# Patient Record
Sex: MALE | Race: WHITE | NOT HISPANIC OR LATINO
[De-identification: names, ages, dates, MRNs, and addresses within clinical notes are randomized per-mention and may not be internally consistent; named-entity substitution may affect disease eponyms.]

---

## 2017-05-18 ENCOUNTER — NON-APPOINTMENT (OUTPATIENT)
Age: 72
End: 2017-05-18

## 2017-05-18 ENCOUNTER — LABORATORY RESULT (OUTPATIENT)
Age: 72
End: 2017-05-18

## 2017-05-18 ENCOUNTER — APPOINTMENT (OUTPATIENT)
Dept: CARDIOLOGY | Facility: CLINIC | Age: 72
End: 2017-05-18

## 2017-05-18 VITALS
OXYGEN SATURATION: 95 % | HEART RATE: 56 BPM | HEIGHT: 69 IN | DIASTOLIC BLOOD PRESSURE: 70 MMHG | WEIGHT: 165 LBS | SYSTOLIC BLOOD PRESSURE: 120 MMHG | BODY MASS INDEX: 24.44 KG/M2

## 2017-05-21 LAB
ALBUMIN SERPL ELPH-MCNC: 4.3 G/DL
ALP BLD-CCNC: 98 U/L
ALT SERPL-CCNC: 20 U/L
ANION GAP SERPL CALC-SCNC: 16 MMOL/L
AST SERPL-CCNC: 16 U/L
BASOPHILS # BLD AUTO: 0.02 K/UL
BASOPHILS NFR BLD AUTO: 0.3 %
BILIRUB SERPL-MCNC: 0.3 MG/DL
BUN SERPL-MCNC: 16 MG/DL
CALCIUM SERPL-MCNC: 9.8 MG/DL
CHLORIDE SERPL-SCNC: 102 MMOL/L
CHOLEST SERPL-MCNC: 163 MG/DL
CHOLEST/HDLC SERPL: 4 RATIO
CO2 SERPL-SCNC: 24 MMOL/L
CREAT SERPL-MCNC: 1.19 MG/DL
EOSINOPHIL # BLD AUTO: 0.18 K/UL
EOSINOPHIL NFR BLD AUTO: 3.1 %
FT4I SERPL CALC-MCNC: 7.8 INDEX
GLUCOSE SERPL-MCNC: 97 MG/DL
HBA1C MFR BLD HPLC: 6 %
HCT VFR BLD CALC: 44.2 %
HDLC SERPL-MCNC: 41 MG/DL
HGB BLD-MCNC: 14.1 G/DL
IMM GRANULOCYTES NFR BLD AUTO: 0.2 %
LDLC SERPL CALC-MCNC: 98 MG/DL
LYMPHOCYTES # BLD AUTO: 1.14 K/UL
LYMPHOCYTES NFR BLD AUTO: 19.6 %
MAN DIFF?: NORMAL
MCHC RBC-ENTMCNC: 29.3 PG
MCHC RBC-ENTMCNC: 31.9 GM/DL
MCV RBC AUTO: 91.7 FL
MONOCYTES # BLD AUTO: 0.47 K/UL
MONOCYTES NFR BLD AUTO: 8.1 %
NEUTROPHILS # BLD AUTO: 4 K/UL
NEUTROPHILS NFR BLD AUTO: 68.7 %
PLATELET # BLD AUTO: 211 K/UL
POTASSIUM SERPL-SCNC: 4.8 MMOL/L
PROT SERPL-MCNC: 7 G/DL
PSA FREE FLD-MCNC: 19.5
PSA FREE SERPL-MCNC: 0.38 NG/ML
PSA SERPL-MCNC: 1.95 NG/ML
RBC # BLD: 4.82 M/UL
RBC # FLD: 13.6 %
SODIUM SERPL-SCNC: 142 MMOL/L
T3RU NFR SERPL: 0.94 INDEX
T4 FREE SERPL-MCNC: 1.2 NG/DL
T4 SERPL-MCNC: 7.3 UG/DL
TRIGL SERPL-MCNC: 119 MG/DL
TSH SERPL-ACNC: 2.48 UIU/ML
WBC # FLD AUTO: 5.82 K/UL

## 2017-06-20 ENCOUNTER — MEDICATION RENEWAL (OUTPATIENT)
Age: 72
End: 2017-06-20

## 2017-06-27 ENCOUNTER — APPOINTMENT (OUTPATIENT)
Dept: INTERNAL MEDICINE | Facility: CLINIC | Age: 72
End: 2017-06-27

## 2017-09-21 ENCOUNTER — APPOINTMENT (OUTPATIENT)
Dept: ORTHOPEDIC SURGERY | Facility: CLINIC | Age: 72
End: 2017-09-21
Payer: MEDICARE

## 2017-09-21 VITALS
SYSTOLIC BLOOD PRESSURE: 137 MMHG | DIASTOLIC BLOOD PRESSURE: 74 MMHG | BODY MASS INDEX: 23.7 KG/M2 | HEART RATE: 66 BPM | HEIGHT: 69 IN | WEIGHT: 160 LBS

## 2017-09-21 DIAGNOSIS — Z78.9 OTHER SPECIFIED HEALTH STATUS: ICD-10-CM

## 2017-09-21 DIAGNOSIS — Z87.39 PERSONAL HISTORY OF OTHER DISEASES OF THE MUSCULOSKELETAL SYSTEM AND CONNECTIVE TISSUE: ICD-10-CM

## 2017-09-21 DIAGNOSIS — M46.92 UNSPECIFIED INFLAMMATORY SPONDYLOPATHY, CERVICAL REGION: ICD-10-CM

## 2017-09-21 PROCEDURE — 72100 X-RAY EXAM L-S SPINE 2/3 VWS: CPT

## 2017-09-21 PROCEDURE — 99205 OFFICE O/P NEW HI 60 MIN: CPT

## 2018-04-09 ENCOUNTER — APPOINTMENT (OUTPATIENT)
Dept: ORTHOPEDIC SURGERY | Facility: CLINIC | Age: 73
End: 2018-04-09
Payer: MEDICARE

## 2018-04-09 DIAGNOSIS — M25.572 PAIN IN LEFT ANKLE AND JOINTS OF LEFT FOOT: ICD-10-CM

## 2018-04-09 PROCEDURE — 73590 X-RAY EXAM OF LOWER LEG: CPT | Mod: LT

## 2018-04-09 PROCEDURE — 73610 X-RAY EXAM OF ANKLE: CPT | Mod: LT

## 2018-04-09 PROCEDURE — 99214 OFFICE O/P EST MOD 30 MIN: CPT

## 2018-04-13 ENCOUNTER — MEDICATION RENEWAL (OUTPATIENT)
Age: 73
End: 2018-04-13

## 2018-05-08 ENCOUNTER — APPOINTMENT (OUTPATIENT)
Dept: ALLERGY | Facility: CLINIC | Age: 73
End: 2018-05-08
Payer: MEDICARE

## 2018-05-08 VITALS
BODY MASS INDEX: 24.44 KG/M2 | SYSTOLIC BLOOD PRESSURE: 130 MMHG | WEIGHT: 165 LBS | HEIGHT: 69 IN | DIASTOLIC BLOOD PRESSURE: 80 MMHG | RESPIRATION RATE: 16 BRPM | HEART RATE: 72 BPM

## 2018-05-08 PROCEDURE — 99204 OFFICE O/P NEW MOD 45 MIN: CPT | Mod: 25

## 2018-05-08 PROCEDURE — 95004 PERQ TESTS W/ALRGNC XTRCS: CPT

## 2018-05-08 PROCEDURE — 95018 ALL TSTG PERQ&IQ DRUGS/BIOL: CPT

## 2018-05-08 RX ORDER — HYDROCORTISONE 25 MG/G
2.5 CREAM TOPICAL
Qty: 2 | Refills: 2 | Status: DISCONTINUED | COMMUNITY
Start: 2017-05-18 | End: 2018-05-08

## 2018-05-15 ENCOUNTER — APPOINTMENT (OUTPATIENT)
Dept: ALLERGY | Facility: CLINIC | Age: 73
End: 2018-05-15
Payer: MEDICARE

## 2018-05-15 PROCEDURE — 95018 ALL TSTG PERQ&IQ DRUGS/BIOL: CPT

## 2018-05-15 PROCEDURE — 95024 IQ TESTS W/ALLERGENIC XTRCS: CPT

## 2018-05-18 ENCOUNTER — LABORATORY RESULT (OUTPATIENT)
Age: 73
End: 2018-05-18

## 2018-05-23 ENCOUNTER — APPOINTMENT (OUTPATIENT)
Dept: ALLERGY | Facility: CLINIC | Age: 73
End: 2018-05-23
Payer: MEDICARE

## 2018-05-23 PROCEDURE — 95117 IMMUNOTHERAPY INJECTIONS: CPT

## 2018-05-24 ENCOUNTER — MOBILE ON CALL (OUTPATIENT)
Age: 73
End: 2018-05-24

## 2018-05-30 ENCOUNTER — APPOINTMENT (OUTPATIENT)
Dept: ALLERGY | Facility: CLINIC | Age: 73
End: 2018-05-30
Payer: MEDICARE

## 2018-05-30 PROCEDURE — 95117 IMMUNOTHERAPY INJECTIONS: CPT

## 2018-06-04 ENCOUNTER — APPOINTMENT (OUTPATIENT)
Dept: ALLERGY | Facility: CLINIC | Age: 73
End: 2018-06-04
Payer: MEDICARE

## 2018-06-04 PROCEDURE — 95117 IMMUNOTHERAPY INJECTIONS: CPT

## 2018-06-06 ENCOUNTER — APPOINTMENT (OUTPATIENT)
Dept: ALLERGY | Facility: CLINIC | Age: 73
End: 2018-06-06
Payer: MEDICARE

## 2018-06-06 DIAGNOSIS — Z86.79 PERSONAL HISTORY OF OTHER DISEASES OF THE CIRCULATORY SYSTEM: ICD-10-CM

## 2018-06-06 DIAGNOSIS — Z82.49 FAMILY HISTORY OF ISCHEMIC HEART DISEASE AND OTHER DISEASES OF THE CIRCULATORY SYSTEM: ICD-10-CM

## 2018-06-06 DIAGNOSIS — Z86.39 PERSONAL HISTORY OF OTHER ENDOCRINE, NUTRITIONAL AND METABOLIC DISEASE: ICD-10-CM

## 2018-06-06 PROCEDURE — 95117 IMMUNOTHERAPY INJECTIONS: CPT

## 2018-06-12 ENCOUNTER — APPOINTMENT (OUTPATIENT)
Dept: ALLERGY | Facility: CLINIC | Age: 73
End: 2018-06-12
Payer: MEDICARE

## 2018-06-12 PROCEDURE — 95117 IMMUNOTHERAPY INJECTIONS: CPT

## 2018-06-14 PROBLEM — Z86.79 HISTORY OF HYPERTENSION: Status: RESOLVED | Noted: 2018-06-14 | Resolved: 2018-06-14

## 2018-06-14 PROBLEM — Z86.39 HISTORY OF HYPERCHOLESTEROLEMIA: Status: RESOLVED | Noted: 2018-06-14 | Resolved: 2018-06-14

## 2018-06-14 PROBLEM — Z82.49 FAMILY HISTORY OF HYPERTENSION: Status: ACTIVE | Noted: 2018-06-14

## 2018-06-15 ENCOUNTER — RECORD ABSTRACTING (OUTPATIENT)
Age: 73
End: 2018-06-15

## 2018-06-18 ENCOUNTER — NON-APPOINTMENT (OUTPATIENT)
Age: 73
End: 2018-06-18

## 2018-06-18 ENCOUNTER — APPOINTMENT (OUTPATIENT)
Dept: ALLERGY | Facility: CLINIC | Age: 73
End: 2018-06-18
Payer: MEDICARE

## 2018-06-18 ENCOUNTER — APPOINTMENT (OUTPATIENT)
Dept: INTERNAL MEDICINE | Facility: CLINIC | Age: 73
End: 2018-06-18
Payer: MEDICARE

## 2018-06-18 VITALS
WEIGHT: 166 LBS | OXYGEN SATURATION: 95 % | TEMPERATURE: 97.4 F | BODY MASS INDEX: 24.59 KG/M2 | DIASTOLIC BLOOD PRESSURE: 73 MMHG | SYSTOLIC BLOOD PRESSURE: 117 MMHG | HEART RATE: 78 BPM | HEIGHT: 69 IN

## 2018-06-18 DIAGNOSIS — K44.9 DIAPHRAGMATIC HERNIA W/OUT OBSTRUCTION OR GANGRENE: ICD-10-CM

## 2018-06-18 DIAGNOSIS — Z00.00 ENCOUNTER FOR GENERAL ADULT MEDICAL EXAMINATION W/OUT ABNORMAL FINDINGS: ICD-10-CM

## 2018-06-18 PROCEDURE — 93000 ELECTROCARDIOGRAM COMPLETE: CPT

## 2018-06-18 PROCEDURE — G0439: CPT

## 2018-06-18 PROCEDURE — 95117 IMMUNOTHERAPY INJECTIONS: CPT

## 2018-06-18 PROCEDURE — 99214 OFFICE O/P EST MOD 30 MIN: CPT | Mod: 25

## 2018-06-18 PROCEDURE — 36415 COLL VENOUS BLD VENIPUNCTURE: CPT

## 2018-06-18 RX ORDER — ASPIRIN 325 MG/1
TABLET, FILM COATED ORAL
Refills: 0 | Status: DISCONTINUED | COMMUNITY
End: 2018-06-18

## 2018-06-18 RX ORDER — LISINOPRIL AND HYDROCHLOROTHIAZIDE TABLETS 10; 12.5 MG/1; MG/1
TABLET ORAL
Refills: 0 | Status: DISCONTINUED | COMMUNITY
End: 2018-06-18

## 2018-06-18 NOTE — PLAN
[FreeTextEntry1] : Annual exam\par had vaccine and recent colon\par \par blood pressure good\par gerd controlled med\par tick exposure  check lyme\par doxycycline 2 tab prn

## 2018-06-18 NOTE — HEALTH RISK ASSESSMENT
[Very Good] : ~his/her~  mood as very good [] : No [No falls in past year] : Patient reported no falls in the past year [0] : 2) Feeling down, depressed, or hopeless: Not at all (0) [FAX9Denad] : 0 [Patient reported colonoscopy was normal] : Patient reported colonoscopy was normal [Change in mental status noted] : No change in mental status noted [Language] : denies difficulty with language [Behavior] : denies difficulty with behavior [Learning/Retaining New Information] : denies difficulty learning/retaining new information [Handling Complex Tasks] : denies difficulty handling complex tasks [Reasoning] : denies difficulty with reasoning [Spatial Ability and Orientation] : denies difficulty with spatial ability and orientation [None] : None [With Family] : lives with family [College] : College [] :  [Sexually Active] : sexually active [Fully functional (bathing, dressing, toileting, transferring, walking, feeding)] : Fully functional (bathing, dressing, toileting, transferring, walking, feeding) [Fully functional (using the telephone, shopping, preparing meals, housekeeping, doing laundry, using] : Fully functional and needs no help or supervision to perform IADLs (using the telephone, shopping, preparing meals, housekeeping, doing laundry, using transportation, managing medications and managing finances) [Reports changes in hearing] : Reports no changes in hearing [Reports changes in vision] : Reports no changes in vision [Reports changes in dental health] : Reports no changes in dental health [Smoke Detector] : smoke detector [Carbon Monoxide Detector] : carbon monoxide detector [Guns at Home] : no guns at home [ColonoscopyDate] : 05/18

## 2018-06-18 NOTE — HISTORY OF PRESENT ILLNESS
[FreeTextEntry1] : Annual exam [de-identified] : Annual exam\par had all vaccine\par colon may 2018\par \par high blood pressure on med\par exposed to tick\par see Dr Jose Manuel Lund for orthopedic problems\par stomach controlled by prilosec\par mild ed\par play golf\par \par

## 2018-06-25 ENCOUNTER — APPOINTMENT (OUTPATIENT)
Dept: ORTHOPEDIC SURGERY | Facility: CLINIC | Age: 73
End: 2018-06-25
Payer: MEDICARE

## 2018-06-25 VITALS
BODY MASS INDEX: 23.7 KG/M2 | HEIGHT: 69 IN | SYSTOLIC BLOOD PRESSURE: 152 MMHG | WEIGHT: 160 LBS | DIASTOLIC BLOOD PRESSURE: 89 MMHG | HEART RATE: 59 BPM

## 2018-06-25 PROCEDURE — 73610 X-RAY EXAM OF ANKLE: CPT | Mod: LT

## 2018-06-25 PROCEDURE — 99214 OFFICE O/P EST MOD 30 MIN: CPT

## 2018-06-27 ENCOUNTER — APPOINTMENT (OUTPATIENT)
Dept: ALLERGY | Facility: CLINIC | Age: 73
End: 2018-06-27
Payer: MEDICARE

## 2018-06-27 PROCEDURE — 95117 IMMUNOTHERAPY INJECTIONS: CPT

## 2018-07-16 ENCOUNTER — APPOINTMENT (OUTPATIENT)
Dept: ALLERGY | Facility: CLINIC | Age: 73
End: 2018-07-16
Payer: MEDICARE

## 2018-07-16 PROCEDURE — 95117 IMMUNOTHERAPY INJECTIONS: CPT

## 2018-07-25 ENCOUNTER — APPOINTMENT (OUTPATIENT)
Dept: ALLERGY | Facility: CLINIC | Age: 73
End: 2018-07-25
Payer: MEDICARE

## 2018-07-25 PROCEDURE — 95117 IMMUNOTHERAPY INJECTIONS: CPT

## 2018-08-01 ENCOUNTER — APPOINTMENT (OUTPATIENT)
Dept: ALLERGY | Facility: CLINIC | Age: 73
End: 2018-08-01
Payer: MEDICARE

## 2018-08-01 PROCEDURE — 95117 IMMUNOTHERAPY INJECTIONS: CPT

## 2018-08-06 ENCOUNTER — APPOINTMENT (OUTPATIENT)
Dept: ALLERGY | Facility: CLINIC | Age: 73
End: 2018-08-06
Payer: MEDICARE

## 2018-08-06 PROCEDURE — 95117 IMMUNOTHERAPY INJECTIONS: CPT

## 2018-08-13 ENCOUNTER — MEDICATION RENEWAL (OUTPATIENT)
Age: 73
End: 2018-08-13

## 2018-08-15 ENCOUNTER — MEDICATION RENEWAL (OUTPATIENT)
Age: 73
End: 2018-08-15

## 2018-08-20 ENCOUNTER — MEDICATION RENEWAL (OUTPATIENT)
Age: 73
End: 2018-08-20

## 2018-10-12 ENCOUNTER — MEDICATION RENEWAL (OUTPATIENT)
Age: 73
End: 2018-10-12

## 2018-10-19 ENCOUNTER — MEDICATION RENEWAL (OUTPATIENT)
Age: 73
End: 2018-10-19

## 2018-10-19 DIAGNOSIS — N41.9 INFLAMMATORY DISEASE OF PROSTATE, UNSPECIFIED: ICD-10-CM

## 2018-11-12 ENCOUNTER — APPOINTMENT (OUTPATIENT)
Dept: INTERNAL MEDICINE | Facility: CLINIC | Age: 73
End: 2018-11-12
Payer: MEDICARE

## 2018-11-12 VITALS
BODY MASS INDEX: 24.44 KG/M2 | SYSTOLIC BLOOD PRESSURE: 116 MMHG | TEMPERATURE: 97.4 F | WEIGHT: 165 LBS | DIASTOLIC BLOOD PRESSURE: 71 MMHG | HEIGHT: 69 IN | HEART RATE: 71 BPM | OXYGEN SATURATION: 96 %

## 2018-11-12 PROCEDURE — 99213 OFFICE O/P EST LOW 20 MIN: CPT

## 2018-11-12 RX ORDER — DOXYCYCLINE HYCLATE 100 MG/1
100 CAPSULE ORAL
Qty: 10 | Refills: 0 | Status: DISCONTINUED | COMMUNITY
Start: 2018-06-18 | End: 2018-11-12

## 2018-11-12 NOTE — PHYSICAL EXAM
[No Acute Distress] : no acute distress [Well Nourished] : well nourished [Normal Sclera/Conjunctiva] : normal sclera/conjunctiva [PERRL] : pupils equal round and reactive to light [No JVD] : no jugular venous distention [Supple] : supple [No Respiratory Distress] : no respiratory distress  [Clear to Auscultation] : lungs were clear to auscultation bilaterally [de-identified] : right turbinate congested with purlulent

## 2018-11-12 NOTE — REVIEW OF SYSTEMS
[Nasal Discharge] : nasal discharge [Cough] : cough [Fever] : no fever [Night Sweats] : no night sweats [Discharge] : no discharge [Vision Problems] : no vision problems [Earache] : no earache [Chest Pain] : no chest pain [Orthopena] : no orthopnea

## 2018-11-12 NOTE — HISTORY OF PRESENT ILLNESS
[FreeTextEntry8] : Cough with green phlegm\par nose and chest\par recent bactrim for prostate\par some nosebleeds\par had low grade temperature\par not sob\par recent nuc cst conn neg\par

## 2019-04-29 ENCOUNTER — APPOINTMENT (OUTPATIENT)
Dept: CARDIOLOGY | Facility: CLINIC | Age: 74
End: 2019-04-29
Payer: MEDICARE

## 2019-04-29 ENCOUNTER — NON-APPOINTMENT (OUTPATIENT)
Age: 74
End: 2019-04-29

## 2019-04-29 VITALS
DIASTOLIC BLOOD PRESSURE: 84 MMHG | HEIGHT: 69 IN | HEART RATE: 74 BPM | BODY MASS INDEX: 23.55 KG/M2 | OXYGEN SATURATION: 94 % | WEIGHT: 159 LBS | SYSTOLIC BLOOD PRESSURE: 166 MMHG

## 2019-04-29 PROCEDURE — 99215 OFFICE O/P EST HI 40 MIN: CPT

## 2019-04-29 PROCEDURE — 93000 ELECTROCARDIOGRAM COMPLETE: CPT

## 2019-07-17 ENCOUNTER — APPOINTMENT (OUTPATIENT)
Dept: INTERNAL MEDICINE | Facility: CLINIC | Age: 74
End: 2019-07-17
Payer: MEDICARE

## 2019-07-17 VITALS
WEIGHT: 164 LBS | TEMPERATURE: 98 F | DIASTOLIC BLOOD PRESSURE: 70 MMHG | BODY MASS INDEX: 24.29 KG/M2 | HEIGHT: 69 IN | HEART RATE: 76 BPM | SYSTOLIC BLOOD PRESSURE: 105 MMHG

## 2019-07-17 PROCEDURE — G0439: CPT

## 2019-07-17 RX ORDER — ROSUVASTATIN CALCIUM 10 MG/1
10 TABLET, FILM COATED ORAL DAILY
Qty: 90 | Refills: 3 | Status: DISCONTINUED | COMMUNITY
Start: 2018-08-13 | End: 2019-07-17

## 2019-07-17 RX ORDER — CEFUROXIME AXETIL 500 MG/1
500 TABLET ORAL
Qty: 20 | Refills: 1 | Status: DISCONTINUED | COMMUNITY
Start: 2018-11-12 | End: 2019-07-17

## 2019-07-17 RX ORDER — EZETIMIBE 10 MG/1
10 TABLET ORAL
Qty: 90 | Refills: 3 | Status: ACTIVE | COMMUNITY
Start: 2019-07-17 | End: 1900-01-01

## 2019-07-17 RX ORDER — SILDENAFIL 20 MG/1
20 TABLET ORAL
Qty: 50 | Refills: 5 | Status: ACTIVE | COMMUNITY
Start: 2018-06-18 | End: 1900-01-01

## 2019-07-17 NOTE — PLAN
[FreeTextEntry1] : Annual exam\par had all vaccines\par colon within 2 years\par \par \par high bp now low\par to lower lisinopril from 40 to 20\par consider lowering other drug\par \par cholesterol to try zetia  intolerant of statins\par \par

## 2019-07-17 NOTE — HISTORY OF PRESENT ILLNESS
[de-identified] : annual exam\par had all vaccine\par colon 2 years ago\par \par blood pressure on med\par intolerant of statin\par vaso vagal\par \par

## 2019-07-17 NOTE — REVIEW OF SYSTEMS
[Fever] : no fever [Night Sweats] : no night sweats [Discharge] : no discharge [Vision Problems] : no vision problems [Earache] : no earache [Chest Pain] : no chest pain [Shortness Of Breath] : no shortness of breath [Wheezing] : no wheezing [Abdominal Pain] : no abdominal pain [Vomiting] : no vomiting [Dysuria] : no dysuria [Hematuria] : no hematuria [Itching] : no itching [Headache] : no headache [Memory Loss] : no memory loss

## 2019-07-22 ENCOUNTER — MEDICATION RENEWAL (OUTPATIENT)
Age: 74
End: 2019-07-22

## 2019-09-20 ENCOUNTER — MEDICATION RENEWAL (OUTPATIENT)
Age: 74
End: 2019-09-20

## 2019-11-07 ENCOUNTER — MEDICATION RENEWAL (OUTPATIENT)
Age: 74
End: 2019-11-07

## 2019-12-12 ENCOUNTER — MEDICATION RENEWAL (OUTPATIENT)
Age: 74
End: 2019-12-12

## 2019-12-26 ENCOUNTER — RX RENEWAL (OUTPATIENT)
Age: 74
End: 2019-12-26

## 2020-09-30 ENCOUNTER — RX RENEWAL (OUTPATIENT)
Age: 75
End: 2020-09-30

## 2020-10-26 ENCOUNTER — NON-APPOINTMENT (OUTPATIENT)
Age: 75
End: 2020-10-26

## 2020-10-26 ENCOUNTER — APPOINTMENT (OUTPATIENT)
Dept: CARDIOLOGY | Facility: CLINIC | Age: 75
End: 2020-10-26
Payer: MEDICARE

## 2020-10-26 VITALS
BODY MASS INDEX: 23.92 KG/M2 | WEIGHT: 162 LBS | HEART RATE: 58 BPM | OXYGEN SATURATION: 96 % | DIASTOLIC BLOOD PRESSURE: 86 MMHG | SYSTOLIC BLOOD PRESSURE: 142 MMHG | TEMPERATURE: 96.7 F

## 2020-10-26 DIAGNOSIS — S82.822D TORUS FRACTURE OF LOWER END OF LEFT FIBULA, SUBSEQUENT ENCOUNTER FOR FRACTURE WITH ROUTINE HEALING: ICD-10-CM

## 2020-10-26 DIAGNOSIS — Z87.09 PERSONAL HISTORY OF OTHER DISEASES OF THE RESPIRATORY SYSTEM: ICD-10-CM

## 2020-10-26 DIAGNOSIS — S82.892A OTHER FRACTURE OF LEFT LOWER LEG, INITIAL ENCOUNTER FOR CLOSED FRACTURE: ICD-10-CM

## 2020-10-26 DIAGNOSIS — Z87.2 PERSONAL HISTORY OF DISEASES OF THE SKIN AND SUBCUTANEOUS TISSUE: ICD-10-CM

## 2020-10-26 DIAGNOSIS — S82.822A TORUS FRACTURE OF LOWER END OF LEFT FIBULA, INITIAL ENCOUNTER FOR CLOSED FRACTURE: ICD-10-CM

## 2020-10-26 PROCEDURE — 36415 COLL VENOUS BLD VENIPUNCTURE: CPT

## 2020-10-26 PROCEDURE — 93000 ELECTROCARDIOGRAM COMPLETE: CPT

## 2020-10-26 PROCEDURE — 99215 OFFICE O/P EST HI 40 MIN: CPT

## 2020-10-26 RX ORDER — HYDROCODONE BITARTRATE AND ACETAMINOPHEN 7.5; 325 MG/1; MG/1
7.5-325 TABLET ORAL
Qty: 20 | Refills: 0 | Status: COMPLETED | COMMUNITY
Start: 2020-07-06

## 2020-10-26 RX ORDER — AZITHROMYCIN 250 MG/1
250 TABLET, FILM COATED ORAL
Qty: 6 | Refills: 2 | Status: DISCONTINUED | COMMUNITY
Start: 2019-09-20 | End: 2020-10-26

## 2020-10-26 RX ORDER — AMOXICILLIN 500 MG/1
500 CAPSULE ORAL
Qty: 28 | Refills: 0 | Status: COMPLETED | COMMUNITY
Start: 2020-07-06

## 2020-10-27 LAB
ALBUMIN SERPL ELPH-MCNC: 4.6 G/DL
ALP BLD-CCNC: 126 U/L
ALT SERPL-CCNC: 19 U/L
ANION GAP SERPL CALC-SCNC: 13 MMOL/L
AST SERPL-CCNC: 17 U/L
BASOPHILS # BLD AUTO: 0.03 K/UL
BASOPHILS NFR BLD AUTO: 0.5 %
BILIRUB SERPL-MCNC: 0.4 MG/DL
BUN SERPL-MCNC: 14 MG/DL
CALCIUM SERPL-MCNC: 9.4 MG/DL
CHLORIDE SERPL-SCNC: 105 MMOL/L
CHOLEST SERPL-MCNC: 179 MG/DL
CO2 SERPL-SCNC: 26 MMOL/L
CREAT SERPL-MCNC: 1.05 MG/DL
EOSINOPHIL # BLD AUTO: 0.09 K/UL
EOSINOPHIL NFR BLD AUTO: 1.5 %
ESTIMATED AVERAGE GLUCOSE: 120 MG/DL
GLUCOSE SERPL-MCNC: 101 MG/DL
HBA1C MFR BLD HPLC: 5.8 %
HCT VFR BLD CALC: 47 %
HDLC SERPL-MCNC: 38 MG/DL
HGB BLD-MCNC: 14.9 G/DL
IMM GRANULOCYTES NFR BLD AUTO: 0.3 %
LDLC SERPL CALC-MCNC: 114 MG/DL
LYMPHOCYTES # BLD AUTO: 1.11 K/UL
LYMPHOCYTES NFR BLD AUTO: 18.6 %
MAN DIFF?: NORMAL
MCHC RBC-ENTMCNC: 29.8 PG
MCHC RBC-ENTMCNC: 31.7 GM/DL
MCV RBC AUTO: 94 FL
MONOCYTES # BLD AUTO: 0.44 K/UL
MONOCYTES NFR BLD AUTO: 7.4 %
NEUTROPHILS # BLD AUTO: 4.28 K/UL
NEUTROPHILS NFR BLD AUTO: 71.7 %
NONHDLC SERPL-MCNC: 141 MG/DL
PLATELET # BLD AUTO: 178 K/UL
POTASSIUM SERPL-SCNC: 4.8 MMOL/L
PROT SERPL-MCNC: 7 G/DL
PSA FREE FLD-MCNC: 17 %
PSA FREE SERPL-MCNC: 0.45 NG/ML
PSA SERPL-MCNC: 2.67 NG/ML
RBC # BLD: 5 M/UL
RBC # FLD: 13 %
SODIUM SERPL-SCNC: 144 MMOL/L
T4 SERPL-MCNC: 6.8 UG/DL
TRIGL SERPL-MCNC: 135 MG/DL
TSH SERPL-ACNC: 2.29 UIU/ML
WBC # FLD AUTO: 5.97 K/UL

## 2020-11-19 RX ORDER — LABETALOL HYDROCHLORIDE 300 MG/1
300 TABLET, FILM COATED ORAL
Qty: 180 | Refills: 1 | Status: DISCONTINUED | COMMUNITY
Start: 2020-11-15 | End: 2020-11-19

## 2020-11-29 ENCOUNTER — RX RENEWAL (OUTPATIENT)
Age: 75
End: 2020-11-29

## 2020-12-16 ENCOUNTER — APPOINTMENT (OUTPATIENT)
Dept: INTERNAL MEDICINE | Facility: CLINIC | Age: 75
End: 2020-12-16

## 2020-12-23 ENCOUNTER — APPOINTMENT (OUTPATIENT)
Dept: OTOLARYNGOLOGY | Facility: CLINIC | Age: 75
End: 2020-12-23
Payer: MEDICARE

## 2020-12-23 VITALS
WEIGHT: 162 LBS | BODY MASS INDEX: 23.99 KG/M2 | HEART RATE: 61 BPM | HEIGHT: 69 IN | SYSTOLIC BLOOD PRESSURE: 132 MMHG | TEMPERATURE: 97.7 F | DIASTOLIC BLOOD PRESSURE: 82 MMHG

## 2020-12-23 PROCEDURE — 99204 OFFICE O/P NEW MOD 45 MIN: CPT | Mod: 25

## 2020-12-23 PROCEDURE — 31238 NSL/SINS NDSC SRG NSL HEMRRG: CPT | Mod: LT

## 2020-12-23 NOTE — END OF VISIT
[FreeTextEntry3] : I personally saw and examined LORELEI ROBB in detail.  I spoke to FABY Llanes regarding the assessment and plan of care. I performed the procedures and relevant physical exam.  I have reviewed the above assessment and plan of care and I agree.  I have made changes to the body of the note wherever necessary and appropriate.

## 2020-12-23 NOTE — PROCEDURE
[FreeTextEntry6] : reason for exam: anterior rhinoscopy insufficient for symptom evaluation \par \par Fiberoptic nasal endoscopy was performed.  R/b/a of procedure was explained to the patient and they agreed to proceed with procedure.  Nasal cavities were treated with afrin and lidocaine prior to nasal endoscopy to make the patient more comfortable.  Significant factors noted: LEFT MIDDLE MEATUS WITH POSTOP CHANGES  Otherwise normal mucosa, normal b/l inferior, middle and superior turbinates, inferior, middle and superior meati and sphenoethmoidal recess.  No nasal polyps.  Septum DEVIATED TO THE RIGHT, NP CLEAR NO MASSES OR LESIONS\par \par scope #: 29\par \par scope # used: 29\par reason for scope: epistaxis\par endoscope was advanced into nasal cavity after afrin and lidocaine on a cotton ball had been used to provide local anesthesia.  Area of bleeding was identified using scope. LEFT ANTERIOR SEPTUM Silver nitrate was used to provide cautery in that area.  Bacitracin was placed after control of epistaxis. \par no nasal masses or lesions were identified. \par

## 2020-12-23 NOTE — ASSESSMENT
[FreeTextEntry1] : Patient is a 75 year old male who presents with recurrent epistaxis from the left nare. Nasal endoscopy significant for prominent blood vessel on anterior superior septum in left nare which was cauterized using silver nitrate. \par \par Plan\par - f/u in 2 weeks\par - patient instructed to apply emollient in nose 3 times a day

## 2020-12-23 NOTE — CONSULT LETTER
[Dear  ___] : Dear  [unfilled], [Consult Letter:] : I had the pleasure of evaluating your patient, [unfilled]. [Please see my note below.] : Please see my note below. [Consult Closing:] : Thank you very much for allowing me to participate in the care of this patient.  If you have any questions, please do not hesitate to contact me. [Sincerely,] : Sincerely, [FreeTextEntry3] : Sincerely, \par \par Lola Beltran MD\par Otolaryngology- Facial Plastics \par [address]\[place] [address]\par (P) - [phone]\par (F) - [phone]\par \par

## 2020-12-23 NOTE — PHYSICAL EXAM
[Midline] : trachea located in midline position [Normal] : no rashes [Nasal Endoscopy Performed] : nasal endoscopy was performed, see procedure section for findings [] : septum deviated to the right [de-identified] : prominent blood vessel noted on anterior and superior septum

## 2020-12-23 NOTE — HISTORY OF PRESENT ILLNESS
[de-identified] : Patient is a 75 year old male who presents with recurrent epistaxis. He had a septoplasty about 15-20 years ago and reports recurrent epistaxis if he sneezes forcefully and in dry weather. He uses saline daily in the nose but no emollients. He had the left nare cauterized right after the septoplasty. His last nose bleed was last week and the nose bleed lasts 10 minutes. He is not on any blood thinners. \par currently having nose bleeds approximately 1x per week

## 2021-01-05 ENCOUNTER — APPOINTMENT (OUTPATIENT)
Dept: OTOLARYNGOLOGY | Facility: CLINIC | Age: 76
End: 2021-01-05

## 2021-02-10 ENCOUNTER — RX RENEWAL (OUTPATIENT)
Age: 76
End: 2021-02-10

## 2021-02-21 ENCOUNTER — RX RENEWAL (OUTPATIENT)
Age: 76
End: 2021-02-21

## 2021-03-16 ENCOUNTER — RX RENEWAL (OUTPATIENT)
Age: 76
End: 2021-03-16

## 2021-03-18 ENCOUNTER — NON-APPOINTMENT (OUTPATIENT)
Age: 76
End: 2021-03-18

## 2021-05-03 ENCOUNTER — APPOINTMENT (OUTPATIENT)
Dept: ORTHOPEDIC SURGERY | Facility: CLINIC | Age: 76
End: 2021-05-03
Payer: MEDICARE

## 2021-05-03 VITALS
HEART RATE: 55 BPM | WEIGHT: 162 LBS | BODY MASS INDEX: 23.99 KG/M2 | DIASTOLIC BLOOD PRESSURE: 77 MMHG | HEIGHT: 69 IN | SYSTOLIC BLOOD PRESSURE: 133 MMHG

## 2021-05-03 DIAGNOSIS — M51.36 OTHER INTERVERTEBRAL DISC DEGENERATION, LUMBAR REGION: ICD-10-CM

## 2021-05-03 DIAGNOSIS — M54.5 LOW BACK PAIN: ICD-10-CM

## 2021-05-03 PROCEDURE — 99204 OFFICE O/P NEW MOD 45 MIN: CPT

## 2021-05-03 PROCEDURE — 99214 OFFICE O/P EST MOD 30 MIN: CPT

## 2021-05-03 PROCEDURE — 72100 X-RAY EXAM L-S SPINE 2/3 VWS: CPT

## 2021-05-03 RX ORDER — DICLOFENAC SODIUM 75 MG/1
75 TABLET, DELAYED RELEASE ORAL
Qty: 30 | Refills: 1 | Status: ACTIVE | COMMUNITY
Start: 2021-05-03 | End: 1900-01-01

## 2021-05-10 ENCOUNTER — RX RENEWAL (OUTPATIENT)
Age: 76
End: 2021-05-10

## 2021-06-30 ENCOUNTER — NON-APPOINTMENT (OUTPATIENT)
Age: 76
End: 2021-06-30

## 2021-06-30 ENCOUNTER — APPOINTMENT (OUTPATIENT)
Dept: CARDIOLOGY | Facility: CLINIC | Age: 76
End: 2021-06-30
Payer: MEDICARE

## 2021-06-30 ENCOUNTER — LABORATORY RESULT (OUTPATIENT)
Age: 76
End: 2021-06-30

## 2021-06-30 VITALS
WEIGHT: 164 LBS | DIASTOLIC BLOOD PRESSURE: 80 MMHG | HEART RATE: 51 BPM | BODY MASS INDEX: 24.29 KG/M2 | SYSTOLIC BLOOD PRESSURE: 120 MMHG | OXYGEN SATURATION: 97 % | HEIGHT: 69 IN

## 2021-06-30 DIAGNOSIS — M54.5 LOW BACK PAIN: ICD-10-CM

## 2021-06-30 DIAGNOSIS — I10 ESSENTIAL (PRIMARY) HYPERTENSION: ICD-10-CM

## 2021-06-30 DIAGNOSIS — R73.9 HYPERGLYCEMIA, UNSPECIFIED: ICD-10-CM

## 2021-06-30 DIAGNOSIS — E78.5 HYPERLIPIDEMIA, UNSPECIFIED: ICD-10-CM

## 2021-06-30 LAB
BASOPHILS # BLD AUTO: 0.03 K/UL
BASOPHILS NFR BLD AUTO: 0.6 %
EOSINOPHIL # BLD AUTO: 0.13 K/UL
EOSINOPHIL NFR BLD AUTO: 2.5 %
HCT VFR BLD CALC: 44.1 %
HGB BLD-MCNC: 14.1 G/DL
IMM GRANULOCYTES NFR BLD AUTO: 0.4 %
LYMPHOCYTES # BLD AUTO: 0.96 K/UL
LYMPHOCYTES NFR BLD AUTO: 18.8 %
MAN DIFF?: NORMAL
MCHC RBC-ENTMCNC: 30.3 PG
MCHC RBC-ENTMCNC: 32 GM/DL
MCV RBC AUTO: 94.6 FL
MONOCYTES # BLD AUTO: 0.55 K/UL
MONOCYTES NFR BLD AUTO: 10.8 %
NEUTROPHILS # BLD AUTO: 3.41 K/UL
NEUTROPHILS NFR BLD AUTO: 66.9 %
PLATELET # BLD AUTO: 154 K/UL
PSA FREE FLD-MCNC: 15 %
PSA FREE SERPL-MCNC: 0.42 NG/ML
PSA SERPL-MCNC: 2.85 NG/ML
RBC # BLD: 4.66 M/UL
RBC # FLD: 12.7 %
T4 SERPL-MCNC: 6.5 UG/DL
TSH SERPL-ACNC: 2.95 UIU/ML
WBC # FLD AUTO: 5.1 K/UL

## 2021-06-30 PROCEDURE — 99214 OFFICE O/P EST MOD 30 MIN: CPT

## 2021-06-30 PROCEDURE — 93000 ELECTROCARDIOGRAM COMPLETE: CPT

## 2021-06-30 RX ORDER — FLUTICASONE PROPIONATE 50 UG/1
50 SPRAY, METERED NASAL DAILY
Qty: 3 | Refills: 1 | Status: ACTIVE | COMMUNITY
Start: 2019-09-20 | End: 1900-01-01

## 2021-07-01 LAB
ALBUMIN SERPL ELPH-MCNC: 4.2 G/DL
ALP BLD-CCNC: 104 U/L
ALT SERPL-CCNC: 21 U/L
ANION GAP SERPL CALC-SCNC: 12 MMOL/L
AST SERPL-CCNC: 17 U/L
B BURGDOR IGG+IGM SER QL IB: NORMAL
BILIRUB SERPL-MCNC: 0.4 MG/DL
BUN SERPL-MCNC: 18 MG/DL
CALCIUM SERPL-MCNC: 9.6 MG/DL
CHLORIDE SERPL-SCNC: 106 MMOL/L
CHOLEST SERPL-MCNC: 163 MG/DL
CO2 SERPL-SCNC: 24 MMOL/L
CREAT SERPL-MCNC: 1.19 MG/DL
ESTIMATED AVERAGE GLUCOSE: 123 MG/DL
GLUCOSE SERPL-MCNC: 103 MG/DL
HBA1C MFR BLD HPLC: 5.9 %
HDLC SERPL-MCNC: 34 MG/DL
LDLC SERPL CALC-MCNC: 98 MG/DL
NONHDLC SERPL-MCNC: 129 MG/DL
POTASSIUM SERPL-SCNC: 4.9 MMOL/L
PROT SERPL-MCNC: 6.4 G/DL
SODIUM SERPL-SCNC: 142 MMOL/L
TRIGL SERPL-MCNC: 152 MG/DL

## 2021-07-18 ENCOUNTER — RX RENEWAL (OUTPATIENT)
Age: 76
End: 2021-07-18

## 2021-10-04 ENCOUNTER — RX RENEWAL (OUTPATIENT)
Age: 76
End: 2021-10-04

## 2021-10-04 RX ORDER — CLONIDINE HYDROCHLORIDE 0.1 MG/1
0.1 TABLET ORAL
Qty: 180 | Refills: 1 | Status: ACTIVE | COMMUNITY
Start: 2020-11-05 | End: 1900-01-01

## 2021-10-22 ENCOUNTER — OUTPATIENT (OUTPATIENT)
Dept: OUTPATIENT SERVICES | Facility: HOSPITAL | Age: 76
LOS: 1 days | End: 2021-10-22
Payer: MEDICARE

## 2021-10-22 DIAGNOSIS — Z20.828 CONTACT WITH AND (SUSPECTED) EXPOSURE TO OTHER VIRAL COMMUNICABLE DISEASES: ICD-10-CM

## 2021-10-22 LAB — SARS-COV-2 RNA SPEC QL NAA+PROBE: SIGNIFICANT CHANGE UP

## 2021-10-22 PROCEDURE — U0003: CPT

## 2021-10-22 PROCEDURE — U0005: CPT

## 2021-10-25 ENCOUNTER — OUTPATIENT (OUTPATIENT)
Dept: OUTPATIENT SERVICES | Facility: HOSPITAL | Age: 76
LOS: 1 days | End: 2021-10-25
Payer: MEDICARE

## 2021-10-25 DIAGNOSIS — M54.16 RADICULOPATHY, LUMBAR REGION: ICD-10-CM

## 2021-10-25 PROCEDURE — 62323 NJX INTERLAMINAR LMBR/SAC: CPT

## 2021-11-12 RX ORDER — OMEPRAZOLE 20 MG/1
20 CAPSULE, DELAYED RELEASE ORAL
Qty: 90 | Refills: 2 | Status: ACTIVE | COMMUNITY
Start: 2020-01-21 | End: 1900-01-01

## 2021-11-16 ENCOUNTER — OUTPATIENT (OUTPATIENT)
Dept: OUTPATIENT SERVICES | Facility: HOSPITAL | Age: 76
LOS: 1 days | End: 2021-11-16
Payer: MEDICARE

## 2021-11-16 DIAGNOSIS — Z20.828 CONTACT WITH AND (SUSPECTED) EXPOSURE TO OTHER VIRAL COMMUNICABLE DISEASES: ICD-10-CM

## 2021-11-16 LAB — SARS-COV-2 RNA SPEC QL NAA+PROBE: SIGNIFICANT CHANGE UP

## 2021-11-16 PROCEDURE — U0003: CPT

## 2021-11-16 PROCEDURE — U0005: CPT

## 2021-11-18 ENCOUNTER — OUTPATIENT (OUTPATIENT)
Dept: OUTPATIENT SERVICES | Facility: HOSPITAL | Age: 76
LOS: 1 days | End: 2021-11-18
Payer: MEDICARE

## 2021-11-18 DIAGNOSIS — M54.16 RADICULOPATHY, LUMBAR REGION: ICD-10-CM

## 2021-11-18 PROCEDURE — 62323 NJX INTERLAMINAR LMBR/SAC: CPT

## 2022-01-16 ENCOUNTER — RX RENEWAL (OUTPATIENT)
Age: 77
End: 2022-01-16

## 2022-02-04 ENCOUNTER — RX RENEWAL (OUTPATIENT)
Age: 77
End: 2022-02-04

## 2022-03-27 ENCOUNTER — NON-APPOINTMENT (OUTPATIENT)
Age: 77
End: 2022-03-27

## 2022-03-28 ENCOUNTER — NON-APPOINTMENT (OUTPATIENT)
Age: 77
End: 2022-03-28

## 2022-04-07 ENCOUNTER — RX RENEWAL (OUTPATIENT)
Age: 77
End: 2022-04-07

## 2022-04-07 RX ORDER — LISINOPRIL 40 MG/1
40 TABLET ORAL
Qty: 90 | Refills: 1 | Status: ACTIVE | COMMUNITY
Start: 2019-12-26 | End: 1900-01-01

## 2022-04-25 ENCOUNTER — APPOINTMENT (OUTPATIENT)
Dept: ORTHOPEDIC SURGERY | Facility: CLINIC | Age: 77
End: 2022-04-25
Payer: MEDICARE

## 2022-04-25 VITALS — BODY MASS INDEX: 24.44 KG/M2 | HEIGHT: 69 IN | WEIGHT: 165 LBS

## 2022-04-25 DIAGNOSIS — M23.91 UNSPECIFIED INTERNAL DERANGEMENT OF RIGHT KNEE: ICD-10-CM

## 2022-04-25 DIAGNOSIS — M17.11 UNILATERAL PRIMARY OSTEOARTHRITIS, RIGHT KNEE: ICD-10-CM

## 2022-04-25 PROCEDURE — 20610 DRAIN/INJ JOINT/BURSA W/O US: CPT | Mod: RT

## 2022-04-25 PROCEDURE — 99214 OFFICE O/P EST MOD 30 MIN: CPT | Mod: 25

## 2022-04-25 PROCEDURE — 73564 X-RAY EXAM KNEE 4 OR MORE: CPT | Mod: RT

## 2022-04-25 NOTE — HISTORY OF PRESENT ILLNESS
[de-identified] : 76 yo male presents for initial evaluation worsening right knee pain for about 4 weeks. He is a golfer and it started after a swing. He denies a specific incident, twist, or trauma. Describes generalized knee pain that occasionally radiates to the medial aspect. He has pain with bending, kneeling, squatting, and navigating stairs. He has not been treated for this in the past. He notes the left knee is minimally painful, maybe from compensating. He has stopped playing golf to avoid aggravating his knee. \par Pmhx of HTN on lisinopril, amlodipine, metoprolol, and clonidine.

## 2022-04-25 NOTE — ADDENDUM
[FreeTextEntry1] : I, Timothy Elliott, acted solely as a scribe for Dr. Javier Giles on this date 04/25/2022.\par All medical record entries made by the Scribe were at my, Dr. Javier Giles, direction and personally dictated by me on 04/25/2022. I have reviewed the chart and agree that the record accurately reflects my personal performance of the history, physical exam, assessment and plan. I have also personally directed, reviewed, and agreed with the chart.

## 2022-04-25 NOTE — DISCUSSION/SUMMARY
[de-identified] : I discussed the underlying pathophysiology of the patient's condition in great detail with the patient. I went over the patient's x-rays with them in great detail. The extent of the patient’s arthritis was discussed in great detail with them. His right knee symptoms appear secondary to an arthritic flare-up and I am recommending a cortisone injection today. If he responds to the cortisone injection then viscosupplementation would be recommended. An information booklet on Monovisc was provided. If he does not respond to the cortisone injection then an MRI would be considered.\par The patient elected to receive a cortisone injection into his right knee today and tolerated it well. I instructed the patient on ROM exercises and told them to take it easy. The use of ice and rest was reviewed with the patient. The patient may resume activities tomorrow. If he starts to feel a lot better by the end of the week then he may start to play golf. All of his questions were answered. He understands and consents to the plan.\par \par FU in 3-4 weeks.\par after a right knee cortisone injection today (04/25/2022).\par for a Monovisc injection if his knee is better.

## 2022-04-25 NOTE — PHYSICAL EXAM
[de-identified] : Constitutional\par o Appearance : well-nourished, well developed, alert, in no acute distress \par Head and Face\par o Head :\par ¦ Inspection : atraumatic, normocephalic\par o Face :\par ¦ Inspection : no visible rash or discoloration\par Respiratory\par o Respiratory Effort: breathing unlabored \par Neurologic\par o Mental Status Examination :\par ¦ Orientation : alert and oriented X 3\par Psychiatric\par o Mood and Affect: mood normal, affect appropriate\par Cardiovascular\par o Observation/Palpation : - no swelling\par Lymphatic\par o Additional Nodes : No palpable lymph nodes present\par \par Right Lower Extremity\par o Buttock : no tenderness, swelling or deformities \par o Right Hip :\par ¦ Inspection/Palpation : no tenderness, swelling or deformities\par ¦ Range of Motion : full and painless in all planes, no crepitance\par ¦ Stability : joint stability intact\par ¦ Strength : extension, flexion, adduction, abduction, internal rotation and external rotation, 5/5\par \par o Right Knee :\par ¦ Inspection/Palpation : medial compartment tenderness to palpation, palpable plica that is nontender, mild swelling, minimal warmth\par ¦ Range of Motion : FROM, mild discomfort with full flexion, no crepitance, good patellofemoral glide \par ¦ Stability : no valgus or varus instability present on provocative testing\par ¦ Strength : flexion and extension 5/5\par ¦ Tests and Signs : negative Anterior Drawer, negative Lachman, negative Jessie\par \par Left Lower Extremity\par o Buttock : no tenderness, swelling or deformities \par o Left Hip :\par ¦ Inspection/Palpation : no tenderness, no swelling or deformities\par ¦ Range of Motion : full and painless in all planes, no crepitance\par ¦ Stability : joint stability intact\par ¦ Strength : extension, flexion, adduction, abduction, internal rotation and external rotation, 5/5\par \par o Left Knee :\par ¦ Inspection/Palpation : no medial or lateral compartment tenderness to palpation, no swelling\par ¦ Range of Motion : active flexion and extension full and painless, no crepitance\par ¦ Stability : no valgus or varus instability present on provocative testing\par ¦ Strength : flexion and extension 5/5\par ¦ Tests and Signs : negative Anterior Drawer, negative Lachman, negative Jessie\par \par Gait and Station:\par Gait: gait normal, no significant extremity swelling or lymphedema, good proprioception and balance\par \par Radiology Results\par o Right Knee : Standing AP, lateral, tunnel, and skyline views of the knee were obtained and reveal moderate medial and patellofemoral arthritis. \par \par o Right Knee injection : Indication- knee osteoarthritis, Anatomic location- right intra-articular joint space, Spray - area was sterilized with Betadine and alcohol and anesthetized with Ethyl Chloride , needle used-20G, Medications given- 5cc's lidocaine, 0.5cc's kenalog, 0.5 cc's dexamethasone, and patient tolerated it well.

## 2022-05-26 ENCOUNTER — APPOINTMENT (OUTPATIENT)
Dept: ORTHOPEDIC SURGERY | Facility: CLINIC | Age: 77
End: 2022-05-26

## 2022-05-27 ENCOUNTER — NON-APPOINTMENT (OUTPATIENT)
Age: 77
End: 2022-05-27

## 2022-06-02 ENCOUNTER — RX RENEWAL (OUTPATIENT)
Age: 77
End: 2022-06-02

## 2022-06-16 ENCOUNTER — RX RENEWAL (OUTPATIENT)
Age: 77
End: 2022-06-16

## 2022-06-16 RX ORDER — METOPROLOL SUCCINATE 100 MG/1
100 TABLET, EXTENDED RELEASE ORAL
Qty: 90 | Refills: 1 | Status: ACTIVE | COMMUNITY
Start: 2020-11-19 | End: 1900-01-01

## 2022-11-04 ENCOUNTER — APPOINTMENT (OUTPATIENT)
Dept: OTOLARYNGOLOGY | Facility: CLINIC | Age: 77
End: 2022-11-04

## 2022-11-04 VITALS
DIASTOLIC BLOOD PRESSURE: 66 MMHG | HEART RATE: 53 BPM | SYSTOLIC BLOOD PRESSURE: 112 MMHG | HEIGHT: 69 IN | TEMPERATURE: 96.6 F | WEIGHT: 165 LBS | BODY MASS INDEX: 24.44 KG/M2

## 2022-11-04 DIAGNOSIS — R04.0 EPISTAXIS: ICD-10-CM

## 2022-11-04 PROCEDURE — 30901 CONTROL OF NOSEBLEED: CPT | Mod: LT

## 2022-11-04 PROCEDURE — 99214 OFFICE O/P EST MOD 30 MIN: CPT | Mod: 25

## 2022-11-04 RX ORDER — AMOXICILLIN AND CLAVULANATE POTASSIUM 875; 125 MG/1; MG/1
875-125 TABLET, COATED ORAL
Qty: 14 | Refills: 0 | Status: COMPLETED | COMMUNITY
Start: 2022-10-12

## 2022-11-04 RX ORDER — TOBRAMYCIN 3 MG/ML
0.3 SOLUTION/ DROPS OPHTHALMIC
Qty: 5 | Refills: 0 | Status: COMPLETED | COMMUNITY
Start: 2022-10-12

## 2022-11-04 RX ORDER — NEOMYCIN SULFATE, POLYMYXIN B SULFATE AND DEXAMETHASONE 3.5; 10000; 1 MG/ML; [USP'U]/ML; MG/ML
3.5-10000-0.1 SUSPENSION OPHTHALMIC
Qty: 5 | Refills: 0 | Status: COMPLETED | COMMUNITY
Start: 2022-10-20

## 2022-11-04 NOTE — PROCEDURE
[FreeTextEntry3] : left nasal septal cauterization\par Afrin and Lidocaine applied topically with cotton in nare. on removal, the vessel began bleeding and suctioned the blood and granulation tissue off. Left mid septum then cauterized with Silver Nitrate.  Small amount of Surgicel placed on anterior septum with some bacitracin.  Bleeding controlled and Pt. tolerated procedure well.\par

## 2022-11-04 NOTE — END OF VISIT
[FreeTextEntry3] : I personally saw and examined Mr. LORELEI ROBB in detail this visit today. I personally reviewed the HPI, PMH, FH, SH, ROS and medications/allergies. I have spoken to DEREK Payne regarding the history and have personally determined the assessment and plan of care, and documented this myself. I was present and participated in all key portions of the encounter and all procedures noted above. I have made changes in the body of the note where appropriate.\par \par Attesting Faculty: See Attending Signature Below

## 2022-11-04 NOTE — ASSESSMENT
[FreeTextEntry1] : Left epistaxis, Prior cauterization 12/23/2020:\par - Left septum cauterized in office today\par - Nasal moisturization, No nose blowing, Open mouth Sneezing, and to avoid bending with head below the heart or heavy lifting.\par - F/u PRN
[id number]

## 2022-11-04 NOTE — HISTORY OF PRESENT ILLNESS
[de-identified] : 78 y/o M with left epistaxis last night.  He had prior epistaxis with cauterization on left by Dr. Beltran 12/23/2020.\par Bleeding last night lasted a few minutes.  No anticoagulants.

## 2022-11-04 NOTE — PHYSICAL EXAM
[de-identified] : Left mid septum with prominent vessel and small granulation tissue.  [Midline] : trachea located in midline position [Normal] : no abnormal secretions [de-identified] : left mid septum with prominent vessel with small mound of granulation tissue [id number] Detailed

## 2022-12-03 RX ORDER — SULFAMETHOXAZOLE AND TRIMETHOPRIM 800; 160 MG/1; MG/1
800-160 TABLET ORAL
Qty: 10 | Refills: 1 | Status: ACTIVE | COMMUNITY
Start: 2018-10-19 | End: 1900-01-01

## 2022-12-23 ENCOUNTER — RX RENEWAL (OUTPATIENT)
Age: 77
End: 2022-12-23

## 2023-01-30 ENCOUNTER — RX RENEWAL (OUTPATIENT)
Age: 78
End: 2023-01-30

## 2023-08-23 ENCOUNTER — APPOINTMENT (OUTPATIENT)
Dept: UROLOGY | Facility: CLINIC | Age: 78
End: 2023-08-23

## 2023-08-28 ENCOUNTER — APPOINTMENT (OUTPATIENT)
Dept: UROLOGY | Facility: CLINIC | Age: 78
End: 2023-08-28
Payer: MEDICARE

## 2023-08-28 VITALS
RESPIRATION RATE: 16 BRPM | HEIGHT: 69 IN | SYSTOLIC BLOOD PRESSURE: 156 MMHG | HEART RATE: 52 BPM | WEIGHT: 160 LBS | DIASTOLIC BLOOD PRESSURE: 77 MMHG | TEMPERATURE: 98.3 F | BODY MASS INDEX: 23.7 KG/M2

## 2023-08-28 DIAGNOSIS — N40.0 BENIGN PROSTATIC HYPERPLASIA WITHOUT LOWER URINARY TRACT SYMPMS: ICD-10-CM

## 2023-08-28 DIAGNOSIS — N28.1 CYST OF KIDNEY, ACQUIRED: ICD-10-CM

## 2023-08-28 DIAGNOSIS — Z12.5 ENCOUNTER FOR SCREENING FOR MALIGNANT NEOPLASM OF PROSTATE: ICD-10-CM

## 2023-08-28 PROCEDURE — 99203 OFFICE O/P NEW LOW 30 MIN: CPT

## 2023-08-28 PROCEDURE — 51798 US URINE CAPACITY MEASURE: CPT

## 2023-08-28 NOTE — ASSESSMENT
[FreeTextEntry1] : counseled pt re guidelines for screening PSA through shared decision making we decided to no longer screen PSA  hx of renal cyst remote unclear where report is will repeat renal u/s   f/u only PRN

## 2023-08-28 NOTE — HISTORY OF PRESENT ILLNESS
[FreeTextEntry1] : 78yr M, comes self-referred for a prostate checkup today. Reports feeling well. Denies any urinary symptoms; Stong stream, no dribbling.  Nocturia: o DTF: q3-4hrs UUI: No KATIANA: No Constipation: No, daily soft BM, no straining Intake: 1 cup coffee, 1 glass juice, 1 glass water UTI: 1 (for this year)  Labs: PSA: 2.85  PSA Free: 0.42, 15%  PVR = 0 cc

## 2023-08-28 NOTE — PHYSICAL EXAM
[Normal Appearance] : normal appearance [Well Groomed] : well groomed [Edema] : no peripheral edema [Bowel Sounds] : normal bowel sounds [Abdomen Tenderness] : non-tender [Normal Station and Gait] : the gait and station were normal for the patient's age [Skin Color & Pigmentation] : normal skin color and pigmentation [] : no rash [Skin Lesions] : no skin lesions [Motor Exam] : the motor exam was normal [Oriented To Time, Place, And Person] : oriented to person, place, and time [Mood] : the mood was normal [Not Anxious] : not anxious

## 2023-08-29 LAB
APPEARANCE: CLEAR
BILIRUBIN URINE: NEGATIVE
BLOOD URINE: NEGATIVE
COLOR: YELLOW
GLUCOSE QUALITATIVE U: NEGATIVE MG/DL
KETONES URINE: NEGATIVE MG/DL
LEUKOCYTE ESTERASE URINE: NEGATIVE
NITRITE URINE: NEGATIVE
PH URINE: 6
PROTEIN URINE: NEGATIVE MG/DL
SPECIFIC GRAVITY URINE: 1.03
UROBILINOGEN URINE: 0.2 MG/DL

## 2023-09-27 ENCOUNTER — APPOINTMENT (OUTPATIENT)
Dept: ULTRASOUND IMAGING | Facility: IMAGING CENTER | Age: 78
End: 2023-09-27

## 2024-05-01 ENCOUNTER — NON-APPOINTMENT (OUTPATIENT)
Age: 79
End: 2024-05-01

## 2024-05-02 ENCOUNTER — APPOINTMENT (OUTPATIENT)
Dept: PAIN MANAGEMENT | Facility: CLINIC | Age: 79
End: 2024-05-02
Payer: MEDICARE

## 2024-05-02 VITALS
WEIGHT: 163 LBS | SYSTOLIC BLOOD PRESSURE: 151 MMHG | BODY MASS INDEX: 24.14 KG/M2 | HEART RATE: 54 BPM | HEIGHT: 69 IN | DIASTOLIC BLOOD PRESSURE: 74 MMHG

## 2024-05-02 PROCEDURE — 99204 OFFICE O/P NEW MOD 45 MIN: CPT

## 2024-05-02 RX ORDER — CELECOXIB 100 MG/1
100 CAPSULE ORAL
Qty: 60 | Refills: 2 | Status: ACTIVE | COMMUNITY
Start: 2024-05-02 | End: 1900-01-01

## 2024-06-09 NOTE — PHYSICAL EXAM
[FreeTextEntry1] : Constitutional: No signs of distress or signs of toxicity. Mental Status: Alert and well oriented. Speech fluent. No aphasia. Fund of knowledge intact. Psychiatric: Mood stable Cranial Nerve: PERRLA: No papilledema; No VFC; EOM full. no nystagmus. No Josemanuel. V1-3 intact. No facial asymmetry, hearing grossly intact; palate elevates symmetrically, tongue midline Motor: No involuntary movements noted.  Adequate bulk, tone throughout, 5/5 strength of all muscle groups DTR: present and symmetrical; no clonus, plantars  downgoing Sensory: intact to primary and secondary modalities; neg Romberg Cerebellar: adequate finger to nose and heel to shin bilaterally. Gait: non antalgic or ataxic. Eyes: no redness or swelling HEENT: intact; no signs of trauma. Neck: No masses noted Pulmonary: no respiratory distress Vascular: no temperature, color change or sudomotor changes.; no edema Musculoskeletal: examination of the cervical spine reveals no midline tenderness, range of motion full upon flexion, extension and lateral rotation. Negative facet tenderness, Negative Spurlings bilaterally. examination of the lumbar spine reveals no midline or paraspinal tenderness; Range of motion full upon flexion, extension and lateral rotation; negative facet loading, No tenderness of sciatic notch, No tenderness of bilateral greater trochanters, Negative SAMEER, negative SLRT bilaterally, Skin: No rash.  Tenderness to palpation of left lumbar paraspinal region

## 2024-06-09 NOTE — HISTORY OF PRESENT ILLNESS
[FreeTextEntry1] : This is a case of a   79 -year-old right-handed male HTN, presents with a chief complaint of Right lumbar region. 2.5 weeks ago which increased the pain after Lumbar BARTOLOME. Takes Advil / tylenol 2 times per day with temporary relief. One in r 4 places on miscle  Onset of pain began 2 years ago, playing golf caused the pain felt a twist in [place]. Since then the pain has been on and off and this season played golf 3 times in a row. The pain continues to be intermittent down the posterior aspect of the right leg to calf. MRI 1.5 years ago. 6 weeks ago went golf. Pain sitting 3/10; standing 6/10. walking makes it worse. 50% relief with OTC. Walking makes it worse.  No bowel or bladder. No numbness.  Melatonin 3 mgs qhs helps sleep. .  Sleep hours per night   Triggers: None Comorbidities: None Other pain conditions: None Imagin.5 years Medications: otc. Interventions: BARTOLOME and TPI CAM therapies: None SHX:  Work status: ETOH, tobacco cannabis PMH: PSH: Tonsillectomy and deciated sx. Family history: Noncontributory Allergies: NKA

## 2024-08-06 ENCOUNTER — APPOINTMENT (OUTPATIENT)
Dept: ORTHOPEDIC SURGERY | Facility: CLINIC | Age: 79
End: 2024-08-06

## 2024-08-06 PROBLEM — M25.551 CHRONIC PAIN OF RIGHT HIP: Status: ACTIVE | Noted: 2024-08-06

## 2024-08-06 PROCEDURE — 73502 X-RAY EXAM HIP UNI 2-3 VIEWS: CPT | Mod: RT

## 2024-08-06 PROCEDURE — 99213 OFFICE O/P EST LOW 20 MIN: CPT

## 2024-08-06 NOTE — HISTORY OF PRESENT ILLNESS
[Stable] : stable [4] : a current pain level of 4/10 [2] : an average pain level of 2/10 [0] : a minimum pain level of 0/10 [6] : a maximum pain level of 6/10 [Standing] : standing [Intermit.] : ~He/She~ states the symptoms seem to be intermittent [Lifting] : worsened by lifting [Running] : worsened by running [Walking] : worsened by walking [Acetaminophen] : relieved by acetaminophen [Exercise Regimen] : relieved by exercise regimen [Heat] : relieved by heat [Ice] : relieved by ice [NSAIDs] : relieved by nonsteroidal anti-inflammatory drugs [Recumbency] : relieved by recumbency [Rest] : relieved by rest [de-identified] : The patient is a 79-year-old gentleman with multiple comorbidities who presents with right hip pain affecting his activities of daily living a little over 6 months.  Patient denies any specific injury or trauma.  Patient states that the pain is his lower back/lateral aspect of the right hip.  Presently he is ambulating without any assistive devices.  He has no issues whatsoever putting his shoes and socks on or getting of a car.  Patient is here for consultation and evaluation for his right hip patient does tell me that he does have lumbar spinal stenosis for which she saw somebody in the past and had an epidural steroid injection.  Patient was given tramadol at the time for his back pain. [Ataxia] : no ataxia [Incontinence] : no incontinence [Loss of Dexterity] : good dexterity [Urinary Ret.] : no urinary retention

## 2024-08-06 NOTE — PHYSICAL EXAM
[UE/LE] : Sensory: Intact in bilateral upper & lower extremities [ALL] : dorsalis pedis, posterior tibial, femoral, popliteal, and radial 2+ and symmetric bilaterally [Normal RUE] : Right Upper Extremity: No scars, rashes, lesions, ulcers, skin intact [Normal LUE] : Left Upper Extremity: No scars, rashes, lesions, ulcers, skin intact [Normal RLE] : Right Lower Extremity: No scars, rashes, lesions, ulcers, skin intact [Normal LLE] : Left Lower Extremity: No scars, rashes, lesions, ulcers, skin intact [Normal] : no peripheral adenopathy appreciated [de-identified] : On physical exam the patient has no difficulty getting up and down the examination table.  Leg lengths appear equal on the table.  Slight discomfort to palpation over the right greater trochanteric region and somewhat down the iliotibial band.  There is no pain to palpation of the left greater trochanteric region or the iliotibial band.  Patient has full range of motion of the right and left hip without significant pain, discomfort, or instability.  Overall strength is intact.  Calves are soft, nontender, no evidence of DVT at this time.  Patient is good motor and sensory to both leg. [de-identified] : Grossly intact [de-identified] : Lower back pain/stenosis [de-identified] : Radiographs which include an AP pelvis and right hip show a well-maintained joint space within the acetabular femoral heads bilaterally.  No gross evidence of AVN no significant arthritic changes noted

## 2024-08-06 NOTE — DISCUSSION/SUMMARY
[Medication Risks Reviewed] : Medication risks reviewed [de-identified] : The patient presents with right hip pain intermittent a little over 6 months cording to the physical exam and radiographs there are no significant arthritic changes in the right hip.  The discomfort that he is having is most likely coming from the stenosis and/or HNP lumbosacral region.  Patient will follow-up with orthospine.  In the interim he will continue with anti-inflammatories exercise and other conservative treatment modalities.  He will follow-up with us office on appearing basis for his right hip.  All of his questions were thoroughly answered to his satisfaction.

## 2025-05-23 ENCOUNTER — NON-APPOINTMENT (OUTPATIENT)
Age: 80
End: 2025-05-23

## 2025-05-23 RX ORDER — SULFAMETHOXAZOLE AND TRIMETHOPRIM 800; 160 MG/1; MG/1
800-160 TABLET ORAL
Qty: 10 | Refills: 0 | Status: ACTIVE | COMMUNITY
Start: 2025-05-23 | End: 1900-01-01

## 2025-05-27 ENCOUNTER — APPOINTMENT (OUTPATIENT)
Dept: UROLOGY | Facility: CLINIC | Age: 80
End: 2025-05-27
Payer: MEDICARE

## 2025-05-27 VITALS
OXYGEN SATURATION: 94 % | SYSTOLIC BLOOD PRESSURE: 138 MMHG | TEMPERATURE: 98.2 F | WEIGHT: 165 LBS | BODY MASS INDEX: 24.44 KG/M2 | HEART RATE: 49 BPM | DIASTOLIC BLOOD PRESSURE: 77 MMHG | HEIGHT: 69 IN

## 2025-05-27 DIAGNOSIS — N41.9 INFLAMMATORY DISEASE OF PROSTATE, UNSPECIFIED: ICD-10-CM

## 2025-05-27 DIAGNOSIS — N40.0 BENIGN PROSTATIC HYPERPLASIA WITHOUT LOWER URINARY TRACT SYMPMS: ICD-10-CM

## 2025-05-27 PROCEDURE — 99213 OFFICE O/P EST LOW 20 MIN: CPT

## 2025-05-29 LAB
ALBUMIN SERPL ELPH-MCNC: 4.5 G/DL
ANION GAP SERPL CALC-SCNC: 15 MMOL/L
APPEARANCE: CLEAR
BACTERIA UR CULT: NORMAL
BILIRUBIN URINE: NEGATIVE
BLOOD URINE: NEGATIVE
BUN SERPL-MCNC: 18 MG/DL
CALCIUM SERPL-MCNC: 9.5 MG/DL
CHLORIDE SERPL-SCNC: 106 MMOL/L
CO2 SERPL-SCNC: 23 MMOL/L
COLOR: YELLOW
CREAT SERPL-MCNC: 1.37 MG/DL
EGFRCR SERPLBLD CKD-EPI 2021: 52 ML/MIN/1.73M2
GLUCOSE QUALITATIVE U: NEGATIVE MG/DL
GLUCOSE SERPL-MCNC: 86 MG/DL
KETONES URINE: NEGATIVE MG/DL
LEUKOCYTE ESTERASE URINE: NEGATIVE
NITRITE URINE: NEGATIVE
PH URINE: 6.5
PHOSPHATE SERPL-MCNC: 3.8 MG/DL
POTASSIUM SERPL-SCNC: 5 MMOL/L
PROTEIN URINE: NEGATIVE MG/DL
PSA SERPL-MCNC: 3.86 NG/ML
SODIUM SERPL-SCNC: 143 MMOL/L
SPECIFIC GRAVITY URINE: 1.01
UROBILINOGEN URINE: 0.2 MG/DL

## 2025-09-02 ENCOUNTER — APPOINTMENT (OUTPATIENT)
Dept: UROLOGY | Facility: CLINIC | Age: 80
End: 2025-09-02